# Patient Record
Sex: MALE | Race: NATIVE HAWAIIAN OR OTHER PACIFIC ISLANDER | HISPANIC OR LATINO | Employment: FULL TIME | URBAN - METROPOLITAN AREA
[De-identification: names, ages, dates, MRNs, and addresses within clinical notes are randomized per-mention and may not be internally consistent; named-entity substitution may affect disease eponyms.]

---

## 2024-04-09 ENCOUNTER — TELEPHONE (OUTPATIENT)
Dept: GASTROENTEROLOGY | Facility: CLINIC | Age: 54
End: 2024-04-09
Payer: COMMERCIAL

## 2024-04-09 NOTE — TELEPHONE ENCOUNTER
"Attempted to contact the patient in regards to the referral we received from [facility] for \"Rectal bleeding\" and \"Colon cancer screening\". Patient did not answer so I left a voicemail requesting a call back. Patient is not established and can be scheduled for the first available DA screening call with any office as long as he isn't having other symptoms or has a provider preference.  "

## 2024-04-15 NOTE — TELEPHONE ENCOUNTER
"Attempted to contact the patient for the second time in regards to the referral we received from [facility] for \"Rectal bleeding\" and \"Colon cancer screening\". Patient did not answer so I left a voicemail requesting a call back, patient is not established and can be scheduled for the first available DA screening call with any office as long as he isn't having any other symptoms or has a provider preference. Deferring out two days to give the patient time to call the office back.  "